# Patient Record
Sex: FEMALE | URBAN - METROPOLITAN AREA
[De-identification: names, ages, dates, MRNs, and addresses within clinical notes are randomized per-mention and may not be internally consistent; named-entity substitution may affect disease eponyms.]

---

## 2017-09-20 DIAGNOSIS — R06.83 SNORING: ICD-10-CM

## 2017-09-20 DIAGNOSIS — G47.00 INSOMNIA: Primary | ICD-10-CM

## 2017-09-28 ENCOUNTER — OFFICE VISIT (OUTPATIENT)
Dept: SLEEP MEDICINE | Facility: CLINIC | Age: 37
End: 2017-09-28
Payer: COMMERCIAL

## 2017-09-28 VITALS
OXYGEN SATURATION: 97 % | BODY MASS INDEX: 33.27 KG/M2 | HEART RATE: 72 BPM | WEIGHT: 207 LBS | SYSTOLIC BLOOD PRESSURE: 106 MMHG | HEIGHT: 66 IN | RESPIRATION RATE: 14 BRPM | DIASTOLIC BLOOD PRESSURE: 71 MMHG

## 2017-09-28 DIAGNOSIS — R06.83 SNORING: ICD-10-CM

## 2017-09-28 DIAGNOSIS — G47.30 OBSERVED SLEEP APNEA: Primary | ICD-10-CM

## 2017-09-28 DIAGNOSIS — G47.19 EXCESSIVE DAYTIME SLEEPINESS: ICD-10-CM

## 2017-09-28 PROCEDURE — 99204 OFFICE O/P NEW MOD 45 MIN: CPT | Performed by: INTERNAL MEDICINE

## 2017-09-28 NOTE — MR AVS SNAPSHOT
After Visit Summary   9/28/2017    Sam Henao    MRN: [id number]           Patient Information     Date Of Birth          1980        Visit Information        Provider Department      9/28/2017 1:30 PM Jomar Ortiz MD [place] [facility]        Today's Diagnoses     Observed sleep apnea    -  1    Snoring        Excessive daytime sleepiness          Care Instructions      Your BMI is Body mass index is 33.41 kg/(m^2).  Weight management is a personal decision.  If you are interested in exploring weight loss strategies, the following discussion covers the approaches that may be successful. Body mass index (BMI) is one way to tell whether you are at a healthy weight, overweight, or obese. It measures your weight in relation to your height.  A BMI of 18.5 to 24.9 is in the healthy range. A person with a BMI of 25 to 29.9 is considered overweight, and someone with a BMI of 30 or greater is considered obese. More than two-thirds of American adults are considered overweight or obese.  Being overweight or obese increases the risk for further weight gain. Excess weight may lead to heart disease and diabetes.  Creating and following plans for healthy eating and physical activity may help you improve your health.  Weight control is part of healthy lifestyle and includes exercise, emotional health, and healthy eating habits. Careful eating habits lifelong are the mainstay of weight control. Though there are significant health benefits from weight loss, long-term weight loss with diet alone may be very difficult to achieve- studies show long-term success with dietary management in less than 10% of people. Attaining a healthy weight may be especially difficult to achieve in those with severe obesity. In some cases, medications, devices and surgical management might be considered.  What can you do?  If you are overweight or obese and are interested in methods for weight loss, you should discuss this  with your provider.     Consider reducing daily calorie intake by 500 calories.     Keep a food journal.     Avoiding skipping meals, consider cutting portions instead.    Diet combined with exercise helps maintain muscle while optimizing fat loss. Strength training is particularly important for building and maintaining muscle mass. Exercise helps reduce stress, increase energy, and improves fitness. Increasing exercise without diet control, however, may not burn enough calories to loose weight.       Start walking three days a week 10-20 minutes at a time    Work towards walking thirty minutes five days a week     Eventually, increase the speed of your walking for 1-2 minutes at time    In addition, we recommend that you review healthy lifestyles and methods for weight loss available through the National Institutes of Health patient information sites:  http://win.niddk.nih.gov/publications/index.htm    And look into health and wellness programs that may be available through your health insurance provider, employer, local community center, or gino club.    Weight management plan: Patient was referred to their PCP to discuss a diet and exercise plan.            Follow-ups after your visit        Your next 10 appointments already scheduled     Nov 12, 2017  8:30 PM CST   PSG Split with BED 4 SH SLEEP   [facility] ([facility])    [address]  [facility] [address]   [phone]            Nov 13, 2017  9:00 AM CST   Actigraphy Pickup with BED 7 SH SLEEP   [facility] ([facility])    [address]  [facility] [address]   [phone]            Nov 30, 2017  1:00 PM CST   Actigraphy Drop Off with SH SLEEP CENTER DME   [facility] ([facility])    [address]  [facility] [address]   [phone]            Nov 30, 2017  1:30 PM CST   Return  "Sleep Patient with Jomar Ortiz MD   [facility] ([facility])    [address] [address] [phone]              Future tests that were ordered for you today     Open Future Orders        Priority Expected Expires Ordered    Comprehensive Sleep Study Routine  3/27/2018 2017            Who to contact     If you have questions or need follow up information about today's clinic visit or your schedule please contact [facility] directly at [phone].  Normal or non-critical lab and imaging results will be communicated to you by TipHivehart, letter or phone within 4 business days after the clinic has received the results. If you do not hear from us within 7 days, please contact the clinic through All4Stafft or phone. If you have a critical or abnormal lab result, we will notify you by phone as soon as possible.  Submit refill requests through Polar Rose or call your pharmacy and they will forward the refill request to us. Please allow 3 business days for your refill to be completed.          Additional Information About Your Visit        TipHiveharLoehmann's Information     Polar Rose lets you send messages to your doctor, view your test results, renew your prescriptions, schedule appointments and more. To sign up, go to [email or web address] . Click on \"Log in\" on the left side of the screen, which will take you to the Welcome page. Then click on \"Sign up Now\" on the right side of the page.     You will be asked to enter the access code listed below, as well as some personal information. Please follow the directions to create your username and password.     Your access code is: [email or web address]  Expires: 2017  2:28 PM     Your access code will  in 90 days. If you need help or a new code, please call your [place] clinic or [phone].        Care EveryWhere ID     This is your Care EveryWhere ID. This could be used by other " "organizations to access your [place] medical records  [id number]        Your Vitals Were     Pulse Respirations Height Pulse Oximetry BMI (Body Mass Index)       72 14 1.676 m (5' 6\") 97% 33.41 kg/m2        Blood Pressure from Last 3 Encounters:   09/28/17 106/71    Weight from Last 3 Encounters:   09/28/17 93.9 kg (207 lb)               Primary Care Provider    None Specified       No primary provider on file.        Equal Access to Services     SÁNCHE[place] WHITE : Hadii aad ku hadasho Soomaali, waaxda luqadaha, qaybta kaalmada adeegyada, waxay deborain hayestefanin tara cervantes . So [facility] [phone].    ATENCIÓN: Si habla español, tiene a sahu disposición servicios gratuitos de asistencia lingüística. Llame al [phone].    We comply with applicable federal civil rights laws and [place] laws. We do not discriminate on the basis of race, color, national origin, age, disability sex, sexual orientation or gender identity.            Thank you!     Thank you for choosing [place] [facility]  for your care. Our goal is always to provide you with excellent care. Hearing back from our patients is one way we can continue to improve our services. Please take a few minutes to complete the written survey that you may receive in the mail after your visit with us. Thank you!             Your Updated Medication List - Protect others around you: Learn how to safely use, store and throw away your medicines at www.disposemymeds.org.      Notice  As of 9/28/2017  2:28 PM    You have not been prescribed any medications.      "

## 2017-09-28 NOTE — NURSING NOTE
"Chief Complaint   Patient presents with     Sleep Problem     Exhaustion, excessive sleep needed to feel rested, zombie feeling       Initial /71  Pulse 72  Resp 14  Ht 1.676 m (5' 6\")  Wt 93.9 kg (207 lb)  SpO2 97%  BMI 33.41 kg/m2 Estimated body mass index is 33.41 kg/(m^2) as calculated from the following:    Height as of this encounter: 1.676 m (5' 6\").    Weight as of this encounter: 93.9 kg (207 lb).  Medication Reconciliation: complete   Neck 39cm  ESS 5  Charo Cornejo MA      "

## 2017-09-28 NOTE — PATIENT INSTRUCTIONS

## 2017-11-01 DIAGNOSIS — R06.83 SNORING: ICD-10-CM

## 2017-11-01 DIAGNOSIS — R40.0 SLEEPINESS: ICD-10-CM

## 2017-11-01 DIAGNOSIS — G47.30 OBSERVED SLEEP APNEA: Primary | ICD-10-CM

## 2017-11-01 NOTE — PROGRESS NOTES
In lab PSG denied by insurance. Message left for medical review.     Will perform home sleep apnea test for assessment of sleep apnea.

## 2017-11-13 ENCOUNTER — OFFICE VISIT (OUTPATIENT)
Dept: SLEEP MEDICINE | Facility: CLINIC | Age: 37
End: 2017-11-13
Payer: COMMERCIAL

## 2017-11-13 DIAGNOSIS — G47.30 OBSERVED SLEEP APNEA: ICD-10-CM

## 2017-11-13 DIAGNOSIS — R06.83 SNORING: Primary | ICD-10-CM

## 2017-11-13 DIAGNOSIS — R40.0 SLEEPINESS: ICD-10-CM

## 2017-11-13 DIAGNOSIS — G47.19 EXCESSIVE DAYTIME SLEEPINESS: ICD-10-CM

## 2017-11-13 PROCEDURE — 95803 ACTIGRAPHY TESTING: CPT | Performed by: INTERNAL MEDICINE

## 2017-11-13 NOTE — PROGRESS NOTES
Actigraphy . Patient demonstrated and verbalized knowledge of use. Patient given sleep log and will fill out accordingly for the next 2 weeks.      Ellie Terrell  Sleep Clinic - Specialist

## 2017-11-13 NOTE — PROGRESS NOTES
Patient instructed on HST use. Patient demonstrated and verbalized knowledge of use. Device programmed to start at 10pm. Device will be returned tomorrow before noon.    Ellie Terrell  Sleep Clinic - Specialist

## 2017-11-13 NOTE — MR AVS SNAPSHOT
After Visit Summary   11/13/2017    Sam Henao    MRN: [id number]           Patient Information     Date Of Birth          1980        Visit Information        Provider Department      11/13/2017 2:45 PM BED 7 SH SLEEP [facility]        Today's Diagnoses     Snoring    -  1       Follow-ups after your visit        Your next 10 appointments already scheduled     Nov 14, 2017 11:00 AM CST   HST Drop Off with  SLEEP CENTER DME   [facility]    [address]  [place] [address] [phone]            Nov 30, 2017  1:00 PM CST   Actigraphy Drop Off with  SLEEP CENTER DME   [facility] ([facility])    [address] [id number] [phone]            Nov 30, 2017  1:30 PM CST   Return Sleep Patient with Jomar Ortiz MD   [facility] ([facility])    [address] [id number] [phone]              Who to contact     If you have questions or need follow up information about today's clinic visit or your schedule please contact [facility] directly at [phone].  Normal or non-critical lab and imaging results will be communicated to you by Synterna Technologieshart, letter or phone within 4 business days after the clinic has received the results. If you do not hear from us within 7 days, please contact the clinic through Synterna Technologieshart or phone. If you have a critical or abnormal lab result, we will notify you by phone as soon as possible.  Submit refill requests through Agistics or call your pharmacy and they will forward the refill request to us. Please allow 3 business days for your refill to be completed.          Additional Information About Your Visit        Agistics Information     Agistics lets you send messages to your doctor, view your test results, renew your  "prescriptions, schedule appointments and more. To sign up, go to [email or web address] . Click on \"Log in\" on the left side of the screen, which will take you to the Welcome page. Then click on \"Sign up Now\" on the right side of the page.     You will be asked to enter the access code listed below, as well as some personal information. Please follow the directions to create your username and password.     Your access code is: [email or web address]  Expires: 2017  1:28 PM     Your access code will  in 90 days. If you need help or a new code, please call your [place] clinic or [phone].        Care EveryWhere ID     This is your Care EveryWhere ID. This could be used by other organizations to access your [place] medical records  [id number]         Blood Pressure from Last 3 Encounters:   17 106/71    Weight from Last 3 Encounters:   17 93.9 kg (207 lb)              Today, you had the following     No orders found for display       Primary Care Provider    None Specified       No primary provider on file.        Equal Access to Services     [facility]: Hadii silvia Corrales, darnell bell, pedro plascencia, brian cervantes . So [facility] [phone].    ATENCIÓN: Si habla español, tiene a sahu disposición servicios gratuitos de asistencia lingüística. Prince al [phone].    We comply with applicable federal civil rights laws and [place] laws. We do not discriminate on the basis of race, color, national origin, age, disability, sex, sexual orientation, or gender identity.            Thank you!     Thank you for choosing [place] [facility]  for your care. Our goal is always to provide you with excellent care. Hearing back from our patients is one way we can continue to improve our services. Please take a few minutes to complete the written survey that you may receive in the mail after your visit with us. Thank you!             Your Updated " Medication List - Protect others around you: Learn how to safely use, store and throw away your medicines at www.disposemymeds.org.      Notice  As of 11/13/2017 11:59 PM    You have not been prescribed any medications.

## 2017-11-13 NOTE — MR AVS SNAPSHOT
After Visit Summary   11/13/2017    Sam Henao    MRN: [id number]           Patient Information     Date Of Birth          1980        Visit Information        Provider Department      11/13/2017 1:30 PM BED 7 SH SLEEP [facility]        Today's Diagnoses     Snoring    -  1       Follow-ups after your visit        Your next 10 appointments already scheduled     Nov 13, 2017  2:45 PM CST   Actigraphy Pickup with BED 7 SH SLEEP   [facility] ([facility])    [address]  [place] [address]   [phone]            Nov 14, 2017 11:00 AM CST   HST  with  SLEEP CENTER DME   [facility] ([facility] [place])    [address] [address]  [place] [address]   [phone]            Nov 30, 2017  1:00 PM CST   Actigraphy Drop Off with  SLEEP CENTER DME   [facility] [place] ([facility])    [address] [address]  [place] [address]   [phone]            Nov 30, 2017  1:30 PM CST   Return Sleep Patient with Jomar Ortiz MD   [facility] ([facility])    [address] [address]   [phone]              Who to contact     If you have questions or need follow up information about today's clinic visit or your schedule please contact [facility] directly at [phone].  Normal or non-critical lab and imaging results will be communicated to you by Signdathart, letter or phone within 4 business days after the clinic has received the results. If you do not hear from us within 7 days, please contact the clinic through Signdathart or phone. If you have a critical or abnormal lab result, we will notify you by phone as soon as possible.  Submit refill requests through SparkWords or call your pharmacy and they will forward the refill request to us. Please allow 3  "business days for your refill to be completed.          Additional Information About Your Visit        MyChart Information     FreshRealmt lets you send messages to your doctor, view your test results, renew your prescriptions, schedule appointments and more. To sign up, go to [email or web address] . Click on \"Log in\" on the left side of the screen, which will take you to the Welcome page. Then click on \"Sign up Now\" on the right side of the page.     You will be asked to enter the access code listed below, as well as some personal information. Please follow the directions to create your username and password.     Your access code is: [email or web address]  Expires: 2017  1:28 PM     Your access code will  in 90 days. If you need help or a new code, please call your [place] clinic or [phone].        Care EveryWhere ID     This is your Care EveryWhere ID. This could be used by other organizations to access your [place] medical records  [id number]         Blood Pressure from Last 3 Encounters:   17 106/71    Weight from Last 3 Encounters:   17 93.9 kg (207 lb)              Today, you had the following     No orders found for display       Primary Care Provider    None Specified       No primary provider on file.        Equal Access to Services     SÁNCHEZ WHITE : Hadii silvia louiso Sodanielali, waaxda luqadaha, qaybta kaalmada adeegyada, brian cervantes . So [facility] [phone].    ATENCIÓN: Si habla español, tiene a sahu disposición servicios gratuitos de asistencia lingüística. Llame al [phone].    We comply with applicable federal civil rights laws and [place] laws. We do not discriminate on the basis of race, color, national origin, age, disability, sex, sexual orientation, or gender identity.            Thank you!     Thank you for choosing [place] [facility]  for your care. Our goal is always to provide you with excellent care. Hearing back from our " patients is one way we can continue to improve our services. Please take a few minutes to complete the written survey that you may receive in the mail after your visit with us. Thank you!             Your Updated Medication List - Protect others around you: Learn how to safely use, store and throw away your medicines at www.disposemymeds.org.      Notice  As of 11/13/2017  2:17 PM    You have not been prescribed any medications.

## 2017-11-14 ENCOUNTER — DOCUMENTATION ONLY (OUTPATIENT)
Dept: SLEEP MEDICINE | Facility: CLINIC | Age: 37
End: 2017-11-14
Attending: INTERNAL MEDICINE

## 2017-11-14 NOTE — NURSING NOTE
HST drop off  Download unsuccessful. Pt will repeat test. Charges have been removed.    Ellie SchmidTerrell  Sleep Clinic - Specialist

## 2017-11-16 ENCOUNTER — OFFICE VISIT (OUTPATIENT)
Dept: SLEEP MEDICINE | Facility: CLINIC | Age: 37
End: 2017-11-16
Payer: COMMERCIAL

## 2017-11-16 DIAGNOSIS — R06.83 SNORING: Primary | ICD-10-CM

## 2017-11-16 PROCEDURE — G0399 HOME SLEEP TEST/TYPE 3 PORTA: HCPCS | Performed by: INTERNAL MEDICINE

## 2017-11-16 NOTE — MR AVS SNAPSHOT
After Visit Summary   11/16/2017    Sam Henao    MRN: [id number]           Patient Information     Date Of Birth          1980        Visit Information        Provider Department      11/16/2017 1:00 PM BED 7 SH SLEEP [facility]        Today's Diagnoses     Snoring    -  1       Follow-ups after your visit        Your next 10 appointments already scheduled     Nov 17, 2017  8:45 AM CST   HST Drop Off with  SLEEP CENTER DME   [facility]    [address]  [facility] [address] [phone]            Nov 30, 2017  1:00 PM CST   Actigraphy Drop Off with  SLEEP CENTER DME   [facility] ([facility])    [address] [phone] [phone]            Nov 30, 2017  1:30 PM CST   Return Sleep Patient with Jomar Ortiz MD   [facility] ([facility])    [address] [address] [phone]              Who to contact     If you have questions or need follow up information about today's clinic visit or your schedule please contact [facility] directly at [phone].  Normal or non-critical lab and imaging results will be communicated to you by Fantasy Shopperhart, letter or phone within 4 business days after the clinic has received the results. If you do not hear from us within 7 days, please contact the clinic through Fantasy Shopperhart or phone. If you have a critical or abnormal lab result, we will notify you by phone as soon as possible.  Submit refill requests through Thrupoint or call your pharmacy and they will forward the refill request to us. Please allow 3 business days for your refill to be completed.          Additional Information About Your Visit        Thrupoint Information     Thrupoint lets you send messages to your doctor, view your test results, renew your  "prescriptions, schedule appointments and more. To sign up, go to [email or web address] . Click on \"Log in\" on the left side of the screen, which will take you to the Welcome page. Then click on \"Sign up Now\" on the right side of the page.     You will be asked to enter the access code listed below, as well as some personal information. Please follow the directions to create your username and password.     Your access code is: [email or web address]  Expires: 2017  1:28 PM     Your access code will  in 90 days. If you need help or a new code, please call your [place] clinic or [phone].        Care EveryWhere ID     This is your Care EveryWhere ID. This could be used by other organizations to access your [place] medical records  [id number]         Blood Pressure from Last 3 Encounters:   17 106/71    Weight from Last 3 Encounters:   17 93.9 kg (207 lb)              Today, you had the following     No orders found for display       Primary Care Provider    None Specified       No primary provider on file.        Equal Access to Services     [facility]: Hadii silvia Corrales, darnell bell, pedro plascencia, brian cervantes . So [facility] [phone].    ATENCIÓN: Si habla español, tiene a sahu disposición servicios gratuitos de asistencia lingüística. Prince al [phone].    We comply with applicable federal civil rights laws and [place] laws. We do not discriminate on the basis of race, color, national origin, age, disability, sex, sexual orientation, or gender identity.            Thank you!     Thank you for choosing [place] [facility]  for your care. Our goal is always to provide you with excellent care. Hearing back from our patients is one way we can continue to improve our services. Please take a few minutes to complete the written survey that you may receive in the mail after your visit with us. Thank you!             Your Updated " Medication List - Protect others around you: Learn how to safely use, store and throw away your medicines at www.disposemymeds.org.      Notice  As of 11/16/2017 11:59 PM    You have not been prescribed any medications.

## 2017-11-17 NOTE — PROGRESS NOTES
Patient instructed on HST use. Patient demonstrated and verbalized knowledge of use. Device programmed to start at 9:30pm. Device will be returned tomorrow before noon.    Ellie Lalham  Sleep Clinic - Specialist

## 2017-11-21 NOTE — PROCEDURES
"HOME SLEEP STUDY INTERPRETATION    Patient: Sam Henao  MRN: [id number]  YOB: 1980  Study Date: 11/16/2017  Referring Provider: No primary care provider on file.;   Ordering Provider: Jomar Ortiz MD, MD     Indications for Home Study: Sam Henao is a 37 year old female with a history of no significant medical comorbidity  who presents with symptoms suggestive of obstructive sleep apnea.    Estimated body mass index is 33.41 kg/(m^2) as calculated from the following:    Height as of 9/28/17: 1.676 m (5' 6\").    Weight as of 9/28/17: 93.9 kg (207 lb).  Total score - [place]: 5 (9/28/2017  1:00 PM)  STOP-BANG: 3/8    Data: A full night home sleep study was performed recording the standard physiologic parameters including body position, movement, sound, nasal pressure, thermal oral airflow, chest and abdominal movements with respiratory inductance plethysmography, and oxygen saturation by pulse oximetry. Pulse rate was estimated by oximetry recording. This study was considered adequate based on > 4 hours of quality oximetry and respiratory recording. As specified by the AASM Manual for the Scoring of Sleep and Associated events, version 2.3, Rule VIII.D 1B, 4% oxygen desaturation scoring for hypopneas is used as a standard of care on all home sleep apnea testing.    Analysis Time:  535 minutes    Respiration:   Sleep Associated Hypoxemia: sustained hypoxemia was not present. Baseline oxygen saturation was 92.7%.  Time with saturation less than or equal to 88% was 2.7 minutes. The lowest oxygen saturation was 78%.   Snoring: Snoring was present.  Respiratory events: The home study revealed a presence of 6 obstructive apneas and 0 mixed and 16 central apneas. There were 27 hypopneas resulting in a combined apnea/hypopnea index [AHI] of 5.5 events per hour.  AHI was 6.4 per hour supine, 0 per hour prone, 0 per hour on left side, and 2.2 per hour on right side.   Pattern: Excluding events noted above, " respiratory rate and pattern was Normal.    Position: Percent of time spent: supine - 77.8%, prone - 1.6%, on left - 0%, on right - 20%.    Heart Rate: By pulse oximetry tachycardia was noted.     Assessment:   Mild obstructive sleep apnea.  Sleep associated hypoxemia was not present.    Recommendations:  Consider auto-CPAP at 5-15 cmH2O, oral appliance therapy or positional therapy.  Suggest optimizing sleep hygiene and avoiding sleep deprivation.  Weight management.    Diagnosis Code(s): Obstructive Sleep Apnea G47.33    Jomar Ortiz MD, MD, November 21, 2017   Diplomate, American Board of Psychiatry and Neurology, Sleep Medicine

## 2017-11-30 ENCOUNTER — VIRTUAL VISIT (OUTPATIENT)
Dept: SLEEP MEDICINE | Facility: CLINIC | Age: 37
End: 2017-11-30
Payer: COMMERCIAL

## 2017-11-30 ENCOUNTER — APPOINTMENT (OUTPATIENT)
Dept: SLEEP MEDICINE | Facility: CLINIC | Age: 37
End: 2017-11-30
Payer: COMMERCIAL

## 2017-11-30 DIAGNOSIS — G47.33 OSA (OBSTRUCTIVE SLEEP APNEA): Primary | ICD-10-CM

## 2017-11-30 DIAGNOSIS — G47.19 EXCESSIVE DAYTIME SLEEPINESS: ICD-10-CM

## 2017-11-30 PROCEDURE — 99442 ZZC PHYSICIAN TELEPHONE EVALUATION 11-20 MIN: CPT | Performed by: INTERNAL MEDICINE

## 2017-11-30 NOTE — PROCEDURES
PHYSICIAN INTERPRETATION  Home Sleep Schedule-Actigraphy      Patient:  Sam Henao  MRN:  [id number]   Ext. ID: [id number]   YOB: 1980  Study Date: 11/13/2017    Referring Physician:  No Ref-Primary, Physician    Dates of recording: from 11/13/17 to 11/27/17                Quality: good      Sleep diary:   correlates well     Sleep onset typically at   09:30 pm         Sleep onset delay typically/or range 7-45 minutes    Sleep offset typically at   08:30 am         Total sleep time estimated is 09:50 hrs, with shortest sleep period time of 07:07 hrs and longest sleep period time of 12:40 hrs.    Sleep periods are interrupted by brief periods of movements.     Light exposure periods are appropriately/inappropriately timed to sleep schedule    Napping is occasionally noted.    Interpretation: Sleep wake pattern is consistent with       regular sleep pattern with sufficient sleep      Interpreted by:  Jomar Ortiz MD

## 2017-11-30 NOTE — MR AVS SNAPSHOT
"              After Visit Summary   2017    Sam Henao    MRN: [id number]           Patient Information     Date Of Birth          1980        Visit Information        Provider Department      2017 4:30 PM Jomar Ortiz MD [facility]        Today's Diagnoses     GENEVIEVE (obstructive sleep apnea)    -  1    Excessive daytime sleepiness           Follow-ups after your visit        Who to contact     If you have questions or need follow up information about today's clinic visit or your schedule please contact [facility] directly at [phone].  Normal or non-critical lab and imaging results will be communicated to you by LUVHANhart, letter or phone within 4 business days after the clinic has received the results. If you do not hear from us within 7 days, please contact the clinic through Atarit or phone. If you have a critical or abnormal lab result, we will notify you by phone as soon as possible.  Submit refill requests through Movetis or call your pharmacy and they will forward the refill request to us. Please allow 3 business days for your refill to be completed.          Additional Information About Your Visit        MyChart Information     Movet[employer] lets you send messages to your doctor, view your test results, renew your prescriptions, schedule appointments and more. To sign up, go to [email or web address] . Click on \"Log in\" on the left side of the screen, which will take you to the Welcome page. Then click on \"Sign up Now\" on the right side of the page.     You will be asked to enter the access code listed below, as well as some personal information. Please follow the directions to create your username and password.     Your access code is: [email or web address]  Expires: 2017  1:28 PM     Your access code will  in 90 days. If you need help or a new code, please call your [place] clinic or [phone].        Care EveryWhere ID     This is your Care " EveryWhere ID. This could be used by other organizations to access your [place] medical records  [id number]         Blood Pressure from Last 3 Encounters:   09/28/17 106/71    Weight from Last 3 Encounters:   09/28/17 93.9 kg (207 lb)              We Performed the Following     Comprehensive DME        Primary Care Provider Fax #    Physician No Ref-Primary [phone]       No address on file        Equal Access to Services     [facility] : Hadii aad ku hadasho Soomaali, waaxda luqadaha, qaybta kaalmada adeegyada, waxay deborain hayaan tara cordovarussdwain lapeter . So [facility] [phone].    ATENCIÓN: Si habla español, tiene a sahu disposición servicios gratuitos de asistencia lingüística. Lindamason al [phone].    We comply with applicable federal civil rights laws and [place] laws. We do not discriminate on the basis of race, color, national origin, age, disability, sex, sexual orientation, or gender identity.            Thank you!     Thank you for choosing [facility]  for your care. Our goal is always to provide you with excellent care. Hearing back from our patients is one way we can continue to improve our services. Please take a few minutes to complete the written survey that you may receive in the mail after your visit with us. Thank you!             Your Updated Medication List - Protect others around you: Learn how to safely use, store and throw away your medicines at www.disposemymeds.org.      Notice  As of 11/30/2017  4:39 PM    You have not been prescribed any medications.

## 2017-11-30 NOTE — PROGRESS NOTES
Phone Sleep Study Follow-Up :    Date on this visit: 11/30/2017    Sam Henao was contacted on phone today for follow-up of her home sleep study done on 11/16/2017 at the [facility] for possible sleep apnea.    Patient had presented with a history of snoring, witnessed apneas and excessive daytime sleepiness.     Respiratory events: The home study revealed a presence of 6 obstructive apneas and 0 mixed and 16 central apneas. There were 27 hypopneas resulting in a combined apnea/hypopnea index [AHI] of 5.5 events per hour.  AHI was 6.4 per hour supine, 0 per hour prone, 0 per hour on left side, and 2.2 per hour on right side.   Pattern: Excluding events noted above, respiratory rate and pattern was Normal.     Position: Percent of time spent: supine - 77.8%, prone - 1.6%, on left - 0%, on right - 20%.    Sleep Associated Hypoxemia: sustained hypoxemia was not present. Baseline oxygen saturation was 92.7%.  Time with saturation less than or equal to 88% was 2.7 minutes. The lowest oxygen saturation was 78%.   Snoring: Snoring was present.     Heart Rate: By pulse oximetry tachycardia was noted.      Assessment:   Mild obstructive sleep apnea.  Sleep associated hypoxemia was not present.    Actigrapy monitoring was performed from 11/13/17 to 11/27/17. Regular bed time and wake time was demonstrated with prolonged sleep duration of 09:50 hrs.     These findings were reviewed with patient.     Past medical/surgical history, family history, social history, medications and allergies were reviewed.      Problem List:  There are no active problems to display for this patient.       Impression/Plan:    1. Mild obstructive sleep apnea   2. Excessive daytime sleepiness     - Patient was counseled regarding sleep study result. We discussed mild sleep apnea and consequences. Patient has excessive daytime sleepiness despite managing adequate nocturnal sleep. We discussed treatment optison for mild sleep  apnea including dental appliance and CPAP therapy. Considering excessive sleepiness, we decided to start auto PAP therapy.     Plan:     1. Start auto PAP therapy 5-15 cm H2O    She will follow up with me in about 7 week(s).     Twelve minutes spent with patient, all of which were spent counseling, consulting, coordinating plan of care.      Jomar Ortiz MD     CC: No Ref-Primary, Physician

## 2017-12-06 ENCOUNTER — TELEPHONE (OUTPATIENT)
Dept: SLEEP MEDICINE | Facility: CLINIC | Age: 37
End: 2017-12-06